# Patient Record
Sex: MALE | Race: BLACK OR AFRICAN AMERICAN | NOT HISPANIC OR LATINO
[De-identification: names, ages, dates, MRNs, and addresses within clinical notes are randomized per-mention and may not be internally consistent; named-entity substitution may affect disease eponyms.]

---

## 2022-07-14 PROBLEM — Z00.00 ENCOUNTER FOR PREVENTIVE HEALTH EXAMINATION: Status: ACTIVE | Noted: 2022-07-14

## 2022-07-27 ENCOUNTER — APPOINTMENT (OUTPATIENT)
Dept: GASTROENTEROLOGY | Facility: CLINIC | Age: 67
End: 2022-07-27

## 2023-02-09 ENCOUNTER — OFFICE (OUTPATIENT)
Dept: URBAN - METROPOLITAN AREA CLINIC 94 | Facility: CLINIC | Age: 68
Setting detail: OPHTHALMOLOGY
End: 2023-02-09
Payer: MEDICARE

## 2023-02-09 DIAGNOSIS — H25.13: ICD-10-CM

## 2023-02-09 DIAGNOSIS — E11.9: ICD-10-CM

## 2023-02-09 DIAGNOSIS — H35.033: ICD-10-CM

## 2023-02-09 DIAGNOSIS — H43.811: ICD-10-CM

## 2023-02-09 PROBLEM — H52.4 PRESBYOPIA: Status: ACTIVE | Noted: 2023-02-09

## 2023-02-09 PROCEDURE — 92004 COMPRE OPH EXAM NEW PT 1/>: CPT | Performed by: OPTOMETRIST

## 2023-02-09 PROCEDURE — 92250 FUNDUS PHOTOGRAPHY W/I&R: CPT | Performed by: OPTOMETRIST

## 2023-02-09 ASSESSMENT — REFRACTION_CURRENTRX
OD_SPHERE: +0.50
OS_AXIS: 075
OS_SPHERE: +1.75
OS_OVR_VA: 20/
OS_VPRISM_DIRECTION: SV
OD_OVR_VA: 20/
OS_CYLINDER: -0.50
OD_SPHERE: +1.50
OD_VPRISM_DIRECTION: SV
OS_OVR_VA: 20/
OS_SPHERE: +1.50
OD_CYLINDER: SPH
OD_VPRISM_DIRECTION: SV
OS_VPRISM_DIRECTION: SV
OD_OVR_VA: 20/

## 2023-02-09 ASSESSMENT — SPHEQUIV_DERIVED
OS_SPHEQUIV: -0.75
OS_SPHEQUIV: 0.25
OD_SPHEQUIV: 0.25
OS_SPHEQUIV: -0.75
OD_SPHEQUIV: 0.25
OD_SPHEQUIV: 0
OD_SPHEQUIV: 0.25
OS_SPHEQUIV: 0

## 2023-02-09 ASSESSMENT — REFRACTION_MANIFEST
OS_AXIS: 165
OD_ADD: +2.25
OS_CYLINDER: +0.50
OS_AXIS: 165
OS_SPHERE: +0.50
OD_SPHERE: +0.50
OD_AXIS: 100
OD_ADD: +2.25
OD_SPHERE: -0.25
OD_AXIS: 175
OS_VA1: 20/25-
OS_ADD: +2.25
OD_AXIS: 175
OS_ADD: +2.25
OD_CYLINDER: -0.50
OS_SPHERE: -1.00
OS_VA1: 20/25-
OS_VA1: 20/20
OD_CYLINDER: +1.00
OD_VA1: 20/20-
OD_VA1: 20/20
OD_VA1: 20/20-
OD_SPHERE: -0.25
OS_AXIS: 090
OS_CYLINDER: -0.50
OS_CYLINDER: +0.50
OD_CYLINDER: +1.00
OS_SPHERE: -1.00

## 2023-02-09 ASSESSMENT — AXIALLENGTH_DERIVED
OS_AL: 23.8237
OD_AL: 23.7713
OD_AL: 23.7713
OS_AL: 24.1255
OD_AL: 23.7713
OS_AL: 24.1255
OD_AL: 23.8706
OS_AL: 23.7248

## 2023-02-09 ASSESSMENT — REFRACTION_AUTOREFRACTION
OS_AXIS: 090
OD_SPHERE: +0.50
OS_CYLINDER: -1.00
OS_SPHERE: +0.50
OD_AXIS: 105
OD_CYLINDER: -1.00

## 2023-02-09 ASSESSMENT — VISUAL ACUITY
OS_BCVA: 20/25-1
OD_BCVA: 20/25

## 2023-02-09 ASSESSMENT — CONFRONTATIONAL VISUAL FIELD TEST (CVF)
OD_FINDINGS: FULL
OS_FINDINGS: FULL

## 2023-02-09 ASSESSMENT — KERATOMETRY
OS_AXISANGLE_DEGREES: 160
OS_K1POWER_DIOPTERS: 42.75
OS_K2POWER_DIOPTERS: 43.00
OD_K1POWER_DIOPTERS: 42.50
OD_AXISANGLE_DEGREES: 030
OD_K2POWER_DIOPTERS: 43.00

## 2023-02-09 ASSESSMENT — SUPERFICIAL PUNCTATE KERATITIS (SPK): OS_SPK: T

## 2023-03-03 ENCOUNTER — APPOINTMENT (OUTPATIENT)
Dept: GASTROENTEROLOGY | Facility: CLINIC | Age: 68
End: 2023-03-03
Payer: COMMERCIAL

## 2023-03-03 VITALS
OXYGEN SATURATION: 100 % | HEIGHT: 67 IN | DIASTOLIC BLOOD PRESSURE: 70 MMHG | BODY MASS INDEX: 21.35 KG/M2 | HEART RATE: 67 BPM | TEMPERATURE: 97.7 F | WEIGHT: 136 LBS | RESPIRATION RATE: 16 BRPM | SYSTOLIC BLOOD PRESSURE: 138 MMHG

## 2023-03-03 PROCEDURE — 99203 OFFICE O/P NEW LOW 30 MIN: CPT

## 2023-03-03 RX ORDER — SODIUM SULFATE, POTASSIUM SULFATE AND MAGNESIUM SULFATE 1.6; 3.13; 17.5 G/177ML; G/177ML; G/177ML
17.5-3.13-1.6 SOLUTION ORAL
Qty: 2 | Refills: 0 | Status: ACTIVE | COMMUNITY
Start: 2023-03-03 | End: 1900-01-01

## 2023-03-03 NOTE — ASSESSMENT
[FreeTextEntry1] : 67-year-old male with diabetes presents for colon cancer screening\par \par \par Average risk colon cancer screening\par Procedure reviewed with patient\par Bowel prep sent to pharmacy\par Preprocedure COVID testing ordered\par \par He has no evidence of cardiac decompensation at this time however in light of ongoing cardiac work-up would appreciate the records prior to procedure for review.\par Patient instructed to have the cardiologist fax over the records he is being seen by the [place] cardiology.\par

## 2023-03-03 NOTE — PHYSICAL EXAM
[Alert] : alert [Normal Voice/Communication] : normal voice/communication [Healthy Appearing] : healthy appearing [No Acute Distress] : no acute distress [Sclera] : the sclera and conjunctiva were normal [Hearing Threshold Finger Rub Not Weld] : hearing was normal [Normal Lips/Gums] : the lips and gums were normal [Oropharynx] : the oropharynx was normal [Normal Appearance] : the appearance of the neck was normal [No Neck Mass] : no neck mass was observed [No Respiratory Distress] : no respiratory distress [No Acc Muscle Use] : no accessory muscle use [Respiration, Rhythm And Depth] : normal respiratory rhythm and effort [Auscultation Breath Sounds / Voice Sounds] : lungs were clear to auscultation bilaterally [Heart Rate And Rhythm] : heart rate was normal and rhythm regular [None] : no edema [Bowel Sounds] : normal bowel sounds [Abdomen Tenderness] : non-tender [No Masses] : no abdominal mass palpated [Abdomen Soft] : soft [Cervical Lymph Nodes Enlarged Posterior Bilaterally] : no posterior cervical lymphadenopathy [Supraclavicular Lymph Nodes Enlarged Bilaterally] : no supraclavicular lymphadenopathy [Cervical Lymph Nodes Enlarged Anterior Bilaterally] : no anterior cervical lymphadenopathy [No CVA Tenderness] : no CVA  tenderness [No Spinal Tenderness] : no spinal tenderness [Abnormal Walk] : normal gait [No Focal Deficits] : no focal deficits [Oriented To Time, Place, And Person] : oriented to person, place, and time

## 2023-03-03 NOTE — REVIEW OF SYSTEMS
[Fever] : no fever [Chills] : no chills [Feeling Poorly] : not feeling poorly [Feeling Tired] : not feeling tired [Recent Weight Loss (___ Lbs)] : no recent weight loss [Abdominal Pain] : no abdominal pain [Vomiting] : no vomiting [Constipation] : no constipation [Diarrhea] : no diarrhea [Heartburn] : no heartburn [Melena (black stool)] : no melena [Bleeding] : no bleeding [Bloating (gassiness)] : no bloating [Negative] : Heme/Lymph

## 2023-03-03 NOTE — HISTORY OF PRESENT ILLNESS
[FreeTextEntry1] : 67-year-old male with a history of diabetes presents for colon cancer screening.\par \par He states his last colonoscopy was 5 or 6 years ago.  He states that he was told everything is okay \par He denies family history of colon cancer to his knowledge.  He denies weight loss, nausea, vomiting, or abdominal pain.  Denies rectal bleeding or melena.\par \par \par He states that a few months ago he noticed palpitations while falling asleep.  He was referred by his PCP through cardiology.  He has an upcoming appointment with the cardiologist on March 13, 2023.  He feels well and denies chest pain, dizziness, further palpitations, lightheadedness or nausea.

## 2023-03-29 LAB — SARS-COV-2 N GENE NPH QL NAA+PROBE: NOT DETECTED

## 2023-03-30 RX ORDER — CHLORHEXIDINE GLUCONATE 213 G/1000ML
1 SOLUTION TOPICAL ONCE
Refills: 0 | Status: DISCONTINUED | OUTPATIENT
Start: 2023-03-31 | End: 2023-04-01

## 2023-03-31 ENCOUNTER — INPATIENT (INPATIENT)
Facility: HOSPITAL | Age: 68
LOS: 0 days | Discharge: HOME | DRG: 247 | End: 2023-04-01
Attending: INTERNAL MEDICINE | Admitting: INTERNAL MEDICINE
Payer: COMMERCIAL

## 2023-03-31 ENCOUNTER — TRANSCRIPTION ENCOUNTER (OUTPATIENT)
Age: 68
End: 2023-03-31

## 2023-03-31 VITALS
RESPIRATION RATE: 20 BRPM | TEMPERATURE: 98 F | SYSTOLIC BLOOD PRESSURE: 129 MMHG | HEART RATE: 62 BPM | OXYGEN SATURATION: 99 % | DIASTOLIC BLOOD PRESSURE: 60 MMHG

## 2023-03-31 DIAGNOSIS — I25.10 ATHEROSCLEROTIC HEART DISEASE OF NATIVE CORONARY ARTERY WITHOUT ANGINA PECTORIS: ICD-10-CM

## 2023-03-31 LAB
ABO RH CONFIRMATION: SIGNIFICANT CHANGE UP
ANION GAP SERPL CALC-SCNC: 14 MMOL/L — SIGNIFICANT CHANGE UP (ref 5–17)
BASOPHILS # BLD AUTO: 0.04 K/UL — SIGNIFICANT CHANGE UP (ref 0–0.2)
BASOPHILS NFR BLD AUTO: 0.8 % — SIGNIFICANT CHANGE UP (ref 0–2)
BLD GP AB SCN SERPL QL: SIGNIFICANT CHANGE UP
BUN SERPL-MCNC: 13.5 MG/DL — SIGNIFICANT CHANGE UP (ref 8–20)
CALCIUM SERPL-MCNC: 9.7 MG/DL — SIGNIFICANT CHANGE UP (ref 8.4–10.5)
CHLORIDE SERPL-SCNC: 102 MMOL/L — SIGNIFICANT CHANGE UP (ref 96–108)
CO2 SERPL-SCNC: 25 MMOL/L — SIGNIFICANT CHANGE UP (ref 22–29)
CREAT SERPL-MCNC: 1.1 MG/DL — SIGNIFICANT CHANGE UP (ref 0.5–1.3)
EGFR: 74 ML/MIN/1.73M2 — SIGNIFICANT CHANGE UP
EOSINOPHIL # BLD AUTO: 0.15 K/UL — SIGNIFICANT CHANGE UP (ref 0–0.5)
EOSINOPHIL NFR BLD AUTO: 3.1 % — SIGNIFICANT CHANGE UP (ref 0–6)
GLUCOSE BLDC GLUCOMTR-MCNC: 239 MG/DL — HIGH (ref 70–99)
GLUCOSE SERPL-MCNC: 181 MG/DL — HIGH (ref 70–99)
HCT VFR BLD CALC: 41.8 % — SIGNIFICANT CHANGE UP (ref 39–50)
HGB BLD-MCNC: 13.8 G/DL — SIGNIFICANT CHANGE UP (ref 13–17)
IMM GRANULOCYTES NFR BLD AUTO: 0.2 % — SIGNIFICANT CHANGE UP (ref 0–0.9)
LYMPHOCYTES # BLD AUTO: 1.34 K/UL — SIGNIFICANT CHANGE UP (ref 1–3.3)
LYMPHOCYTES # BLD AUTO: 27.5 % — SIGNIFICANT CHANGE UP (ref 13–44)
MCHC RBC-ENTMCNC: 31 PG — SIGNIFICANT CHANGE UP (ref 27–34)
MCHC RBC-ENTMCNC: 33 GM/DL — SIGNIFICANT CHANGE UP (ref 32–36)
MCV RBC AUTO: 93.9 FL — SIGNIFICANT CHANGE UP (ref 80–100)
MONOCYTES # BLD AUTO: 0.53 K/UL — SIGNIFICANT CHANGE UP (ref 0–0.9)
MONOCYTES NFR BLD AUTO: 10.9 % — SIGNIFICANT CHANGE UP (ref 2–14)
NEUTROPHILS # BLD AUTO: 2.81 K/UL — SIGNIFICANT CHANGE UP (ref 1.8–7.4)
NEUTROPHILS NFR BLD AUTO: 57.5 % — SIGNIFICANT CHANGE UP (ref 43–77)
PA ADP PRP-ACNC: 256 PRU — SIGNIFICANT CHANGE UP (ref 180–376)
PLATELET # BLD AUTO: 243 K/UL — SIGNIFICANT CHANGE UP (ref 150–400)
POTASSIUM SERPL-MCNC: 4.5 MMOL/L — SIGNIFICANT CHANGE UP (ref 3.5–5.3)
POTASSIUM SERPL-SCNC: 4.5 MMOL/L — SIGNIFICANT CHANGE UP (ref 3.5–5.3)
RBC # BLD: 4.45 M/UL — SIGNIFICANT CHANGE UP (ref 4.2–5.8)
RBC # FLD: 12.9 % — SIGNIFICANT CHANGE UP (ref 10.3–14.5)
SODIUM SERPL-SCNC: 141 MMOL/L — SIGNIFICANT CHANGE UP (ref 135–145)
WBC # BLD: 4.88 K/UL — SIGNIFICANT CHANGE UP (ref 3.8–10.5)
WBC # FLD AUTO: 4.88 K/UL — SIGNIFICANT CHANGE UP (ref 3.8–10.5)

## 2023-03-31 PROCEDURE — 93010 ELECTROCARDIOGRAM REPORT: CPT | Mod: 76

## 2023-03-31 RX ORDER — SODIUM CHLORIDE 9 MG/ML
250 INJECTION INTRAMUSCULAR; INTRAVENOUS; SUBCUTANEOUS ONCE
Refills: 0 | Status: COMPLETED | OUTPATIENT
Start: 2023-03-31 | End: 2023-03-31

## 2023-03-31 RX ORDER — ASPIRIN/CALCIUM CARB/MAGNESIUM 324 MG
1 TABLET ORAL
Refills: 0 | DISCHARGE

## 2023-03-31 RX ORDER — PRASUGREL 5 MG/1
1 TABLET, FILM COATED ORAL
Qty: 0 | Refills: 0 | DISCHARGE
Start: 2023-03-31

## 2023-03-31 RX ORDER — SODIUM CHLORIDE 9 MG/ML
1000 INJECTION, SOLUTION INTRAVENOUS
Refills: 0 | Status: DISCONTINUED | OUTPATIENT
Start: 2023-03-31 | End: 2023-04-01

## 2023-03-31 RX ORDER — ATORVASTATIN CALCIUM 80 MG/1
40 TABLET, FILM COATED ORAL AT BEDTIME
Refills: 0 | Status: DISCONTINUED | OUTPATIENT
Start: 2023-03-31 | End: 2023-04-01

## 2023-03-31 RX ORDER — PRASUGREL 5 MG/1
1 TABLET, FILM COATED ORAL
Qty: 90 | Refills: 3
Start: 2023-03-31 | End: 2024-03-24

## 2023-03-31 RX ORDER — INSULIN LISPRO 100/ML
VIAL (ML) SUBCUTANEOUS
Refills: 0 | Status: DISCONTINUED | OUTPATIENT
Start: 2023-03-31 | End: 2023-04-01

## 2023-03-31 RX ORDER — PRASUGREL 5 MG/1
60 TABLET, FILM COATED ORAL ONCE
Refills: 0 | Status: COMPLETED | OUTPATIENT
Start: 2023-03-31 | End: 2023-03-31

## 2023-03-31 RX ORDER — ASPIRIN/CALCIUM CARB/MAGNESIUM 324 MG
81 TABLET ORAL DAILY
Refills: 0 | Status: DISCONTINUED | OUTPATIENT
Start: 2023-03-31 | End: 2023-04-01

## 2023-03-31 RX ORDER — DEXTROSE 50 % IN WATER 50 %
15 SYRINGE (ML) INTRAVENOUS ONCE
Refills: 0 | Status: DISCONTINUED | OUTPATIENT
Start: 2023-03-31 | End: 2023-04-01

## 2023-03-31 RX ORDER — DEXTROSE 50 % IN WATER 50 %
25 SYRINGE (ML) INTRAVENOUS ONCE
Refills: 0 | Status: DISCONTINUED | OUTPATIENT
Start: 2023-03-31 | End: 2023-04-01

## 2023-03-31 RX ORDER — DEXTROSE 50 % IN WATER 50 %
12.5 SYRINGE (ML) INTRAVENOUS ONCE
Refills: 0 | Status: DISCONTINUED | OUTPATIENT
Start: 2023-03-31 | End: 2023-04-01

## 2023-03-31 RX ORDER — PRASUGREL 5 MG/1
10 TABLET, FILM COATED ORAL DAILY
Refills: 0 | Status: DISCONTINUED | OUTPATIENT
Start: 2023-04-01 | End: 2023-04-01

## 2023-03-31 RX ORDER — ATORVASTATIN CALCIUM 80 MG/1
1 TABLET, FILM COATED ORAL
Refills: 0 | DISCHARGE

## 2023-03-31 RX ORDER — INSULIN LISPRO 100/ML
VIAL (ML) SUBCUTANEOUS AT BEDTIME
Refills: 0 | Status: DISCONTINUED | OUTPATIENT
Start: 2023-03-31 | End: 2023-04-01

## 2023-03-31 RX ORDER — GLUCAGON INJECTION, SOLUTION 0.5 MG/.1ML
1 INJECTION, SOLUTION SUBCUTANEOUS ONCE
Refills: 0 | Status: DISCONTINUED | OUTPATIENT
Start: 2023-03-31 | End: 2023-04-01

## 2023-03-31 RX ORDER — ACETAMINOPHEN 500 MG
1000 TABLET ORAL ONCE
Refills: 0 | Status: DISCONTINUED | OUTPATIENT
Start: 2023-03-31 | End: 2023-04-01

## 2023-03-31 RX ORDER — CLOPIDOGREL BISULFATE 75 MG/1
75 TABLET, FILM COATED ORAL DAILY
Refills: 0 | Status: DISCONTINUED | OUTPATIENT
Start: 2023-03-31 | End: 2023-03-31

## 2023-03-31 RX ADMIN — PRASUGREL 60 MILLIGRAM(S): 5 TABLET, FILM COATED ORAL at 15:22

## 2023-03-31 RX ADMIN — ATORVASTATIN CALCIUM 40 MILLIGRAM(S): 80 TABLET, FILM COATED ORAL at 21:31

## 2023-03-31 RX ADMIN — Medication 81 MILLIGRAM(S): at 10:44

## 2023-03-31 RX ADMIN — CLOPIDOGREL BISULFATE 75 MILLIGRAM(S): 75 TABLET, FILM COATED ORAL at 10:44

## 2023-03-31 RX ADMIN — SODIUM CHLORIDE 250 MILLILITER(S): 9 INJECTION INTRAMUSCULAR; INTRAVENOUS; SUBCUTANEOUS at 11:45

## 2023-03-31 RX ADMIN — SODIUM CHLORIDE 250 MILLILITER(S): 9 INJECTION INTRAMUSCULAR; INTRAVENOUS; SUBCUTANEOUS at 16:08

## 2023-03-31 NOTE — DISCHARGE NOTE PROVIDER - NSDCFUADDINST_GEN_ALL_CORE_FT
Go to the ED with any acute onset of chest pain, palpitations, shortness of breath or dizziness. Do NOT miss a dose or stop taking your Aspirin and Effient, these medications keep your stent open and prevent a heart attack. If anyone tells you to stop these medications, speak to your cardiologist immediately.  Managing risk factors will help keep your stent open and prevent future blockages, risk factors may include: high blood pressure, high cholesterol, diabetes, obesity, sedentary life style and smoking.    Your diet should be low in fat, cholesterol, salt and carbohydrates, increase fruits (caution if diabetic), vegetables and whole grains/fiber rich foods.   Take all your cardiac  medications as prescribed.    No heavy lifting, excessine bending or range of motion on affected limb 5-7days.  No submerging the site in water (pool/bath) 5-7days. You may start showering the day after your procedure. Remove the dressing, cleanse the area with plain soap and water and then pat dry. You may place a bandaid for 1-2 days, NO cream,/lotion/medicines on the site.   Bruising and a small nodule at the site is normal. Call your doctor for any bleeding, swelling/hardness, increasing pain or redness, loss of sensation in the hand/leg or discoloration.   If heavy bleeding or large lumps form, hold pressure at the spot and come to the Emergency Room.  Exercise is a very important factor in heart health. Once your post procedure restrictions have passed, you should engage in heart healthy, aerobic exercise. Be sure to have clearance from your cardiologist. Cardiac rehab programs could be extremely beneficial and your cardiologist could help set this up.   Follow up with your cardiologist within 1-2 weeks after your procedure.   Call your cardiologist or our unit ([phone]) with any questions or concerns that may arise.

## 2023-03-31 NOTE — DISCHARGE NOTE PROVIDER - HOSPITAL COURSE
Assessment: 67yo male with h/o HLD, DM, prior COVID, experiencing exertional chest pressure and palpitations, referred to Dr Snell and NST with basal anetroseptal ischemia and +TID with ratio 1.36, referred to Dr Kraft and underwent C 3/13/23 with significant 1V CAD (mLAD 80% and Diag 80%) no PCI at that time and now presented for stage PCI to LAD/Diag by Dr Kraft.     Now s/p BECCA X 1 to mLAD and BECCA X 2 to Diag, BECCA X 2 to Diag via RFA, procedure performed by Dr. Kraft, received Plavix load, heparin for A/C, Fentanyl and Versed IV intraprocedurally, arrived to recovery in NAD and HDS, Post PCI ECG with no acute changes, SB/SR 40-50's on tele, asymptomatic, avoid AVN blockers for now, RFA closed with perclose, access site stable, no bleed/hematoma, distal pulse +, plan for overnight admission.       P2Y12: 256, discussed with Dr Kraft and Plavix D/C'd and given Effient 60mg po load and will cont Effient 10mg po daily in addition to daily aspirin 81mg po daily.    Effient script sent to [facility], approved and covered by insurance, can  upon discharge tomorrow am and will be given a 90day supply with 3 refils and also given a paper script.    Plan:  -Formal cath report pending  -Post procedure management/monitoring per protocol  -Access site precautions  -Bedrest x 3hours post procedure  -Labs and EKG in am  -Repeat ECG if any clinical indication or change on tele  -NS 250mL bolus post cath   -Continue current medical therapy  -Dual anti platelet therapy with aspirin 81mg po daily and Effient 10mg po daily  -Cont statin therapy with Lipitor 40mg po qHS, consider increasing pending am lipid panel  -Hold metformin X 48hrs  -F/S QID with correction scale coverage while in-patient  -Educated regarding strict adherence with DAPT   -Educated regarding post procedure management and care  -Discussed the importance of RF modification  -Cardiac rehab info provided/referral and communication to cardiac rehab completed  -F/U outpt in 1-2 weeks with Cardiologist Dr. Snell   -DISPO: Plan for D/C in am if remains HDS, ECG and labs in am stable and without complications           Assessment: 69yo male with h/o HLD, DM, prior COVID, experiencing exertional chest pressure and palpitations, referred to Dr Snell and NST with basal anetroseptal ischemia and +TID with ratio 1.36, referred to Dr Kraft and underwent [facility] 3/13/23 with significant 1V CAD (mLAD 80% and Diag 80%) no PCI at that time and now presented for stage PCI to LAD/Diag by Dr Kraft.     Now s/p BECCA X 1 to mLAD and BECCA X 2 to Diag, BECCA X 2 to Diag via RFA, procedure performed by Dr. Kraft.  SB/SR 40-50's on tele, asymptomatic, avoid AVN blockers for now  RFA closed with perclose with hematoma.  CBC stable.  US with no pseduo.  Spoke to Dr. Ohara and pt cleared for discharge           P2Y12: 256, discussed with Dr Kraft and Plavix D/C'd and given Effient 60mg po load and will cont Effient 10mg po daily in addition to daily aspirin 81mg po daily.

## 2023-03-31 NOTE — DISCHARGE NOTE PROVIDER - NSDCMRMEDTOKEN_GEN_ALL_CORE_FT
Aspir 81 oral delayed release tablet: 1 orally once a day  atorvastatin 40 mg oral tablet: 1 orally once a day  metFORMIN 1000 mg oral tablet: 1 orally 2 times a day  Plavix 75 mg oral tablet: 1 orally once a day  prasugrel 10 mg oral tablet: 1 tab(s) orally once a day   Aspir 81 oral delayed release tablet: 1 orally once a day  atorvastatin 40 mg oral tablet: 1 orally once a day  metFORMIN 1000 mg oral tablet: 1 tablet orally 2 times a day hold for 48 hours  prasugrel 10 mg oral tablet: 1 tab(s) orally once a day  prasugrel 10 mg oral tablet: 1 tab(s) orally once a day

## 2023-03-31 NOTE — DISCHARGE NOTE PROVIDER - NSDCCPTREATMENT_GEN_ALL_CORE_FT
PRINCIPAL PROCEDURE  Procedure: Percutaneous coronary intervention (PCI) with insertion of stent into left anterior descending (LAD) coronary artery  Findings and Treatment: Go to the ED with any acute onset of chest pain, palpitations, shortness of breath or dizziness. Do NOT miss a dose or stop taking your Aspirin and Effient, these medications keep your stent open and prevent a heart attack. If anyone tells you to stop these medications, speak to your cardiologist immediately.  Managing risk factors will help keep your stent open and prevent future blockages, risk factors may include: high blood pressure, high cholesterol, diabetes, obesity, sedentary life style and smoking.    Your diet should be low in fat, cholesterol, salt and carbohydrates, increase fruits (caution if diabetic), vegetables and whole grains/fiber rich foods.   Take all your cardiac  medications as prescribed.    No heavy lifting, excessine bending or range of motion on affected limb 5-7days.  No submerging the site in water (pool/bath) 5-7days. You may start showering the day after your procedure. Remove the dressing, cleanse the area with plain soap and water and then pat dry. You may place a bandaid for 1-2 days, NO cream,/lotion/medicines on the site.   Bruising and a small nodule at the site is normal. Call your doctor for any bleeding, swelling/hardness, increasing pain or redness, loss of sensation in the hand/leg or discoloration.   If heavy bleeding or large lumps form, hold pressure at the spot and come to the Emergency Room.  Exercise is a very important factor in heart health. Once your post procedure restrictions have passed, you should engage in heart healthy, aerobic exercise. Be sure to have clearance from your cardiologist. Cardiac rehab programs could be extremely beneficial and your cardiologist could help set this up.   Follow up with your cardiologist within 1-2 weeks after your procedure.   Call your cardiologist or our unit ([phone]) with any questions or concerns that may arise.

## 2023-03-31 NOTE — DISCHARGE NOTE PROVIDER - CARE PROVIDER_API CALL
Jamar Snell)  Cardiovascular Disease; Internal Medicine  [address] [place] [address]  Phone: [phone]  Fax: [phone]  Follow Up Time:     Kvng Kraft)  Cardiovascular Disease; Internal Medicine; Interventional Cardiology  [address] [place] [address]  Phone: [phone]  Fax: [phone]  Follow Up Time:

## 2023-03-31 NOTE — H&P PST ADULT - HISTORY OF PRESENT ILLNESS
Department of Cardiology                                                                  [facility]/[place] [address] [address]                                                            Telephone: [phone]. Fax:[phone]                                                                             Pre- Procedure Progress Note      HPI:  69yo male with h/o HLD, DM, prior COVID, experiencing exertional chest pressure and palpitations, referred to Dr Snell and NST with basal anetroseptal ischemia and +TID with ratio 1.36, referred to Dr Kraft and underwent [facility] 3/13/23 with significant 1V CAD (mLAD 80% and Diag 80%) no PCI at that time and now presented for stage PCI to LAD/Diag by Dr Kraft.       Symptoms:        Angina (Class): III       Ischemic Symptoms: CP    Heart Failure: no    Assessment of LVEF:       EF: 60-65%       Assessed by: TTE       Date: 8/10/22    Echo 8/10/22:  LVEF 60-65%  trace MR/TR     [facility] GSH 3/13/23:  No sign LMCA disease  mild disease LC and RCA  mLAD 80%  Diag 80%    Stress Test: Date: 9/20/22   basal anetroseptal ischemia and +TID with ratio 1.36        Associated Risk Factors:        Frailty Assessment: (none/mild/mod/severe)       Cerebrovascular Disease: N/A       Chronic Lung Disease: N/A       Peripheral Arterial Disease: N/A       Chronic Kidney Disease (if yes, what is GFR): N/A       Uncontrolled Diabetes (if yes, what is HgbA1C or FBS): N/A       Poorly Controlled Hypertension (if yes, what is SBP): N/A       Morbid Obesity (if yes, what is BMI): N/A       History of Recent Ventricular Arrhythmia: N/A       Inability to Ambulate Safely: N/A       Need for Therapeutic Anticoagulation: N/A       Antiplatelet or Contrast Allergy: N/A    Antianginal Therapies:        Beta Blockers:         Calcium Channel Blockers:        Long Acting Nitrates:        Ranexa:     	  MEDICATIONS:            atorvastatin 40 milliGRAM(s) Oral at bedtime    aspirin  chewable 81 milliGRAM(s) Oral daily  chlorhexidine 4% Liquid 1 Application(s) Topical Once  clopidogrel Tablet 75 milliGRAM(s) Oral daily  sodium chloride 0.9% Bolus 250 milliLiter(s) IV Bolus once        ROS: as stated above, otherwise negative    PHYSICAL EXAM:  Constitutional: A & O x 3  HEENT:   Normal oral mucosa, PERRL, EOMI	  Cardiovascular: Normal S1 S2, No JVD, No murmurs  Respiratory: Lungs clear to auscultation	  Gastrointestinal:  Soft, Non-tender, + BS	  Skin: No rashes, No ecchymoses, No cyanosis  Neurologic: Non-focal  Extremities: Normal range of motion, no edema  Vascular: Peripheral pulses palpable + bilaterally      T(C): 36.8 (03-31-23 @ 09:38), Max: 36.8 (03-31-23 @ 09:38)  HR: 43 (03-31-23 @ 13:10) (43 - 62)  BP: 123/58 (03-31-23 @ 13:10) (123/58 - 143/63)  RR: 15 (03-31-23 @ 13:10) (15 - 20)  SpO2: 98% (03-31-23 @ 13:10) (98% - 99%)  Wt(kg): --      I&O's Summary      Daily     Daily     TELEMETRY: 	      ECG:  	    LABS:	 	                                    13.8   4.88  )-----------( 243      ( 31 Mar 2023 09:42 )             41.8     03-31    141  |  102  |  13.5  ----------------------------<  181<H>  4.5   |  25.0  |  1.10    Ca    9.7      31 Mar 2023 09:42        Tnl:    Lipid Profile:   TC  TG  LDL  HDL    HgA1c:     proBNP:       Indication:     Risk Stratification:  ASA:  Mallampati:  Bleeding Risk:  Creatinine:  GFR:    Coronary anatomy:    Plan/Recommendations:   -plan for ****  -preferred access: RRA ***  -patient seen and examined  -confirmed appropriate NPO duration  -ECG and Labs reviewed  -Aspirin 81mg po pre-cath***  -NS 250mL IV bolus pre-cath***  -procedure discussed with patient; risks and benefits explained, questions answered  -consent obtained by attending IC      Risks, benefits, and alternatives reviewed.  Risks including but not limited to MI, death, stroke, bleeding, infection, vessel injury, hematoma, renal failure, allergic reaction, urgent open heart surgery, restenosis and stent thrombosis were reviewed.  All questions answered.  Patient is agreeable to proceed.                                                                              Department of Cardiology                                                                  [facility]/[place] [address] [address]                                                            Telephone: [phone]. Fax:[phone]                                                                             Pre- Procedure Progress Note      HPI:  69yo male with h/o HLD, DM, prior COVID, experiencing exertional chest pressure and palpitations, referred to Dr Snell and NST with basal anetroseptal ischemia and +TID with ratio 1.36, referred to Dr Kraft and underwent [facility] 3/13/23 with significant 1V CAD (mLAD 80% and Diag 80%) no PCI at that time and now presented for stage PCI to LAD/Diag by Dr Kraft.       Symptoms:        Angina (Class): III       Ischemic Symptoms: CP    Heart Failure: no    Assessment of LVEF:       EF: 60-65%       Assessed by: TTE       Date: 8/10/22    Echo 8/10/22:  LVEF 60-65%  trace MR/TR     [facility] GSH 3/13/23:  No sign LMCA disease  mild disease LC and RCA  mLAD 80%  Diag 80%    Stress Test: Date: 9/20/22   basal anetroseptal ischemia and +TID with ratio 1.36        Associated Risk Factors:        Frailty Assessment: (none/mild/mod/severe): mild       Cerebrovascular Disease: N/A       Chronic Lung Disease: N/A       Peripheral Arterial Disease: N/A       Chronic Kidney Disease (if yes, what is GFR): N/A       Uncontrolled Diabetes (if yes, what is HgbA1C or FBS): N/A       Poorly Controlled Hypertension (if yes, what is SBP): N/A       Morbid Obesity (if yes, what is BMI): N/A       History of Recent Ventricular Arrhythmia: N/A       Inability to Ambulate Safely: N/A       Need for Therapeutic Anticoagulation: N/A       Antiplatelet or Contrast Allergy: N/A    Antianginal Therapies: none       Beta Blockers:         Calcium Channel Blockers:        Long Acting Nitrates:        Ranexa:     Meds:  atorvastatin 40 milliGRAM(s) Oral at bedtime  aspirin  chewable 81 milliGRAM(s) Oral daily  chlorhexidine 4% Liquid 1 Application(s) Topical Once  clopidogrel Tablet 75 milliGRAM(s) Oral daily  sodium chloride 0.9% Bolus 250 milliLiter(s) IV Bolus once      ROS: as stated above, otherwise negative    PHYSICAL EXAM:  Constitutional: A & O x 3  HEENT:   Normal oral mucosa, PERRL, EOMI	  Cardiovascular: Normal S1 S2, No JVD, No murmurs  Respiratory: Lungs clear to auscultation	  Gastrointestinal:  Soft, Non-tender, + BS	  Skin: No rashes, No ecchymoses, No cyanosis  Neurologic: Non-focal  Extremities: Normal range of motion, no edema  Vascular: Peripheral pulses palpable + bilaterally      T(C): 36.8 (03-31-23 @ 09:38), Max: 36.8 (03-31-23 @ 09:38)  HR: 43 (03-31-23 @ 13:10) (43 - 62)  BP: 123/58 (03-31-23 @ 13:10) (123/58 - 143/63)  RR: 15 (03-31-23 @ 13:10) (15 - 20)  SpO2: 98% (03-31-23 @ 13:10) (98% - 99%)    ECG:  SR 62	    LABS:	                           13.8   4.88  )-----------( 243      ( 31 Mar 2023 09:42 )             41.8     03-31    141  |  102  |  13.5  ----------------------------<  181<H>  4.5   |  25.0  |  1.10    Ca    9.7      31 Mar 2023 09:42      Lipid Profile: with am labs    HgA1c: with am labs

## 2023-03-31 NOTE — DISCHARGE NOTE PROVIDER - NSDCFUSCHEDAPPT_GEN_ALL_CORE_FT
Cheri Polo  [facility]  GASTRO [address]  Scheduled Appointment: 05/23/2023

## 2023-03-31 NOTE — H&P PST ADULT - ASSESSMENT
Indication: 67yo male with class III anginal symptoms, NST with basal anteroseptal ischemia, + TID with ratio 1.36    Risk Stratification:  ASA: 3  Mallampati: 2  Bleeding Risk: 1.2%  Creatinine: 1.1  GFR: 74    Coronary anatomy:  LMCA no disease  mLAD 80%  Diag 80%  LCx mild disease  RCA mild disease    Plan/Recommendations:   -plan for stage PCI to LAD/Diag  -preferred access: RA vs FA  -patient seen and examined  -confirmed appropriate NPO duration  -ECG and Labs reviewed  -Aspirin 81mg and Plavix 75mg po pre-cath  -NS 250mL IV bolus pre-cath  -procedure discussed with patient; risks and benefits explained, questions answered  -consent obtained by attending IC      Risks, benefits, and alternatives reviewed.  Risks including but not limited to MI, death, stroke, bleeding, infection, vessel injury, hematoma, renal failure, allergic reaction, urgent open heart surgery, restenosis and stent thrombosis were reviewed.  All questions answered.  Patient is agreeable to proceed.

## 2023-03-31 NOTE — ASU PATIENT PROFILE, ADULT - FALL HARM RISK - UNIVERSAL INTERVENTIONS
Bed in lowest position, wheels locked, appropriate side rails in place/Call bell, personal items and telephone in reach/Instruct patient to call for assistance before getting out of bed or chair/Non-slip footwear when patient is out of bed/[place] to call system/Physically safe environment - no spills, clutter or unnecessary equipment/Purposeful Proactive Rounding/Room/bathroom lighting operational, light cord in reach

## 2023-03-31 NOTE — DISCHARGE NOTE PROVIDER - NSDCCPCAREPLAN_GEN_ALL_CORE_FT
PRINCIPAL DISCHARGE DIAGNOSIS  Diagnosis: CAD (coronary artery disease)  Assessment and Plan of Treatment: Coronary artery disease is the buildup of plaque in the arteries that supply oxygen-rich blood to your heart. Plaque causes a narrowing or blockage that could result in a heart attack. Symptoms include chest pain or discomfort, shortness of breath, dizziness, palpitations, fatigue or reduced exercise tolerance. .  Go to the ED with any acute onset of chest pain, palpitations, shortness of breath or dizziness. Do NOT miss a dose or stop taking your Aspirin and Effient, these medications keep your stent open and prevent a heart attack. If anyone tells you to stop these medications, speak to your cardiologist immediately.  Managing risk factors will help keep your stent open and prevent future blockages, risk factors may include: high blood pressure, high cholesterol, diabetes, obesity, sedentary life style and smoking.    Your diet should be low in fat, cholesterol, salt and carbohydrates, increase fruits (caution if diabetic), vegetables and whole grains/fiber rich foods.   Take all your cardiac  medications as prescribed.    Exercise is a very important factor in heart health. Once your post procedure restrictions have passed, you should engage in heart healthy, aerobic exercise. Be sure to have clearance from your cardiologist. Cardiac rehab programs could be extremely beneficial and your cardiologist could help set this up.   Follow up with your cardiologist within 1-2 weeks after your procedure.   Call your cardiologist or our unit ([phone]) with any questions or concerns that may arise.     PRINCIPAL DISCHARGE DIAGNOSIS  Diagnosis: CAD (coronary artery disease)  Assessment and Plan of Treatment: Coronary artery disease is the buildup of plaque in the arteries that supply oxygen-rich blood to your heart. Plaque causes a narrowing or blockage that could result in a heart attack. Symptoms include chest pain or discomfort, shortness of breath, dizziness, palpitations, fatigue or reduced exercise tolerance. .  Go to the ED with any acute onset of chest pain, palpitations, shortness of breath or dizziness. Do NOT miss a dose or stop taking your Aspirin and Effient, these medications keep your stent open and prevent a heart attack. If anyone tells you to stop these medications, speak to your cardiologist immediately.  Managing risk factors will help keep your stent open and prevent future blockages, risk factors may include: high blood pressure, high cholesterol, diabetes, obesity, sedentary life style and smoking.    Your diet should be low in fat, cholesterol, salt and carbohydrates, increase fruits (caution if diabetic), vegetables and whole grains/fiber rich foods.   Take all your cardiac  medications as prescribed.    Exercise is a very important factor in heart health. Once your post procedure restrictions have passed, you should engage in heart healthy, aerobic exercise. Be sure to have clearance from your cardiologist. Cardiac rehab programs could be extremely beneficial and your cardiologist could help set this up.   Follow up with your cardiologist within 1-2 weeks after your procedure.   Call your cardiologist or our unit ([phone]) with any questions or concerns that may arise.      SECONDARY DISCHARGE DIAGNOSES  Diagnosis: HLD (hyperlipidemia)  Assessment and Plan of Treatment: Your diet should be low in fat, cholesterol, salt and carbohydrates, increase fruits (caution if diabetic), vegetables and whole grains/fiber rich foods. Take your cholesterol lowering medications as prescribed. Routine follow up lipid panels    Diagnosis: DM (diabetes mellitus)  Assessment and Plan of Treatment: Follow a carbohydrate controlled diet. Monitor your blood sugar levels before meals and at bedtime and keep a log of your values. Follow up for routine HbA1C levels which indicate your sugar control over the previous few months. You should ensure you have a provider that is closely monitoring your diabetes and keeping your sugar levels well controlled. Weight loss, carb controlled healthy diet and exercise can help improve your glucose control. It is also improtant to monitor for low sugar levels as levels too low (below 70) can be very dangerous. You should always carry sugar tablets or something to help bring the sugar level up if it drops too low. Symptoms of low sugar levls are: fatigue, lightheaded, mental confusion, fainting, sweating, shaking and excessive hunger.

## 2023-04-01 ENCOUNTER — TRANSCRIPTION ENCOUNTER (OUTPATIENT)
Age: 68
End: 2023-04-01

## 2023-04-01 VITALS — HEART RATE: 66 BPM | SYSTOLIC BLOOD PRESSURE: 110 MMHG | RESPIRATION RATE: 16 BRPM | DIASTOLIC BLOOD PRESSURE: 59 MMHG

## 2023-04-01 LAB
A1C WITH ESTIMATED AVERAGE GLUCOSE RESULT: 9.2 % — HIGH (ref 4–5.6)
ALBUMIN SERPL ELPH-MCNC: 3.8 G/DL — SIGNIFICANT CHANGE UP (ref 3.3–5.2)
ALP SERPL-CCNC: 66 U/L — SIGNIFICANT CHANGE UP (ref 40–120)
ALT FLD-CCNC: 37 U/L — SIGNIFICANT CHANGE UP
ANION GAP SERPL CALC-SCNC: 11 MMOL/L — SIGNIFICANT CHANGE UP (ref 5–17)
AST SERPL-CCNC: 42 U/L — HIGH
BASOPHILS # BLD AUTO: 0.05 K/UL — SIGNIFICANT CHANGE UP (ref 0–0.2)
BASOPHILS NFR BLD AUTO: 0.9 % — SIGNIFICANT CHANGE UP (ref 0–2)
BILIRUB SERPL-MCNC: 0.5 MG/DL — SIGNIFICANT CHANGE UP (ref 0.4–2)
BUN SERPL-MCNC: 19.1 MG/DL — SIGNIFICANT CHANGE UP (ref 8–20)
CALCIUM SERPL-MCNC: 9.3 MG/DL — SIGNIFICANT CHANGE UP (ref 8.4–10.5)
CHLORIDE SERPL-SCNC: 101 MMOL/L — SIGNIFICANT CHANGE UP (ref 96–108)
CHOLEST SERPL-MCNC: 102 MG/DL — SIGNIFICANT CHANGE UP
CO2 SERPL-SCNC: 22 MMOL/L — SIGNIFICANT CHANGE UP (ref 22–29)
CREAT SERPL-MCNC: 1.02 MG/DL — SIGNIFICANT CHANGE UP (ref 0.5–1.3)
EGFR: 81 ML/MIN/1.73M2 — SIGNIFICANT CHANGE UP
EOSINOPHIL # BLD AUTO: 0.21 K/UL — SIGNIFICANT CHANGE UP (ref 0–0.5)
EOSINOPHIL NFR BLD AUTO: 3.8 % — SIGNIFICANT CHANGE UP (ref 0–6)
ESTIMATED AVERAGE GLUCOSE: 217 MG/DL — HIGH (ref 68–114)
GLUCOSE SERPL-MCNC: 137 MG/DL — HIGH (ref 70–99)
GLUCOSE SERPL-MCNC: 137 MG/DL — HIGH (ref 70–99)
HCT VFR BLD CALC: 34.5 % — LOW (ref 39–50)
HCT VFR BLD CALC: 35.6 % — LOW (ref 39–50)
HCT VFR BLD CALC: 35.6 % — LOW (ref 39–50)
HDLC SERPL-MCNC: 35 MG/DL — LOW
HGB BLD-MCNC: 11.7 G/DL — LOW (ref 13–17)
HGB BLD-MCNC: 11.9 G/DL — LOW (ref 13–17)
HGB BLD-MCNC: 12 G/DL — LOW (ref 13–17)
IMM GRANULOCYTES NFR BLD AUTO: 0.5 % — SIGNIFICANT CHANGE UP (ref 0–0.9)
LIPID PNL WITH DIRECT LDL SERPL: 59 MG/DL — SIGNIFICANT CHANGE UP
LYMPHOCYTES # BLD AUTO: 1.18 K/UL — SIGNIFICANT CHANGE UP (ref 1–3.3)
LYMPHOCYTES # BLD AUTO: 21.3 % — SIGNIFICANT CHANGE UP (ref 13–44)
MAGNESIUM SERPL-MCNC: 1.9 MG/DL — SIGNIFICANT CHANGE UP (ref 1.6–2.6)
MCHC RBC-ENTMCNC: 31.4 PG — SIGNIFICANT CHANGE UP (ref 27–34)
MCHC RBC-ENTMCNC: 31.6 PG — SIGNIFICANT CHANGE UP (ref 27–34)
MCHC RBC-ENTMCNC: 31.7 PG — SIGNIFICANT CHANGE UP (ref 27–34)
MCHC RBC-ENTMCNC: 33.4 GM/DL — SIGNIFICANT CHANGE UP (ref 32–36)
MCHC RBC-ENTMCNC: 33.7 GM/DL — SIGNIFICANT CHANGE UP (ref 32–36)
MCHC RBC-ENTMCNC: 33.9 GM/DL — SIGNIFICANT CHANGE UP (ref 32–36)
MCV RBC AUTO: 93.5 FL — SIGNIFICANT CHANGE UP (ref 80–100)
MCV RBC AUTO: 93.7 FL — SIGNIFICANT CHANGE UP (ref 80–100)
MCV RBC AUTO: 93.9 FL — SIGNIFICANT CHANGE UP (ref 80–100)
MONOCYTES # BLD AUTO: 0.76 K/UL — SIGNIFICANT CHANGE UP (ref 0–0.9)
MONOCYTES NFR BLD AUTO: 13.7 % — SIGNIFICANT CHANGE UP (ref 2–14)
NEUTROPHILS # BLD AUTO: 3.32 K/UL — SIGNIFICANT CHANGE UP (ref 1.8–7.4)
NEUTROPHILS NFR BLD AUTO: 59.8 % — SIGNIFICANT CHANGE UP (ref 43–77)
NON HDL CHOLESTEROL: 67 MG/DL — SIGNIFICANT CHANGE UP
PLATELET # BLD AUTO: 194 K/UL — SIGNIFICANT CHANGE UP (ref 150–400)
PLATELET # BLD AUTO: 203 K/UL — SIGNIFICANT CHANGE UP (ref 150–400)
PLATELET # BLD AUTO: 203 K/UL — SIGNIFICANT CHANGE UP (ref 150–400)
POTASSIUM SERPL-MCNC: 4.5 MMOL/L — SIGNIFICANT CHANGE UP (ref 3.5–5.3)
POTASSIUM SERPL-SCNC: 4.5 MMOL/L — SIGNIFICANT CHANGE UP (ref 3.5–5.3)
PROT SERPL-MCNC: 6.6 G/DL — SIGNIFICANT CHANGE UP (ref 6.6–8.7)
RBC # BLD: 3.69 M/UL — LOW (ref 4.2–5.8)
RBC # BLD: 3.79 M/UL — LOW (ref 4.2–5.8)
RBC # BLD: 3.8 M/UL — LOW (ref 4.2–5.8)
RBC # FLD: 12.9 % — SIGNIFICANT CHANGE UP (ref 10.3–14.5)
RBC # FLD: 12.9 % — SIGNIFICANT CHANGE UP (ref 10.3–14.5)
RBC # FLD: 13 % — SIGNIFICANT CHANGE UP (ref 10.3–14.5)
SODIUM SERPL-SCNC: 134 MMOL/L — LOW (ref 135–145)
TRIGL SERPL-MCNC: 40 MG/DL — SIGNIFICANT CHANGE UP
WBC # BLD: 5.55 K/UL — SIGNIFICANT CHANGE UP (ref 3.8–10.5)
WBC # BLD: 5.74 K/UL — SIGNIFICANT CHANGE UP (ref 3.8–10.5)
WBC # BLD: 6.28 K/UL — SIGNIFICANT CHANGE UP (ref 3.8–10.5)
WBC # FLD AUTO: 5.55 K/UL — SIGNIFICANT CHANGE UP (ref 3.8–10.5)
WBC # FLD AUTO: 5.74 K/UL — SIGNIFICANT CHANGE UP (ref 3.8–10.5)
WBC # FLD AUTO: 6.28 K/UL — SIGNIFICANT CHANGE UP (ref 3.8–10.5)

## 2023-04-01 PROCEDURE — C1760: CPT

## 2023-04-01 PROCEDURE — 85025 COMPLETE CBC W/AUTO DIFF WBC: CPT

## 2023-04-01 PROCEDURE — 93926 LOWER EXTREMITY STUDY: CPT

## 2023-04-01 PROCEDURE — 92978 ENDOLUMINL IVUS OCT C 1ST: CPT | Mod: LD

## 2023-04-01 PROCEDURE — 83036 HEMOGLOBIN GLYCOSYLATED A1C: CPT

## 2023-04-01 PROCEDURE — C1769: CPT

## 2023-04-01 PROCEDURE — C1753: CPT

## 2023-04-01 PROCEDURE — C9600: CPT | Mod: LD

## 2023-04-01 PROCEDURE — 93005 ELECTROCARDIOGRAM TRACING: CPT

## 2023-04-01 PROCEDURE — 82947 ASSAY GLUCOSE BLOOD QUANT: CPT

## 2023-04-01 PROCEDURE — C1725: CPT

## 2023-04-01 PROCEDURE — 86900 BLOOD TYPING SEROLOGIC ABO: CPT

## 2023-04-01 PROCEDURE — 80053 COMPREHEN METABOLIC PANEL: CPT

## 2023-04-01 PROCEDURE — 82962 GLUCOSE BLOOD TEST: CPT

## 2023-04-01 PROCEDURE — 83735 ASSAY OF MAGNESIUM: CPT

## 2023-04-01 PROCEDURE — 85576 BLOOD PLATELET AGGREGATION: CPT

## 2023-04-01 PROCEDURE — 86850 RBC ANTIBODY SCREEN: CPT

## 2023-04-01 PROCEDURE — C1874: CPT

## 2023-04-01 PROCEDURE — 80061 LIPID PANEL: CPT

## 2023-04-01 PROCEDURE — 93010 ELECTROCARDIOGRAM REPORT: CPT

## 2023-04-01 PROCEDURE — 93926 LOWER EXTREMITY STUDY: CPT | Mod: 26,RT

## 2023-04-01 PROCEDURE — 86901 BLOOD TYPING SEROLOGIC RH(D): CPT

## 2023-04-01 PROCEDURE — C1894: CPT

## 2023-04-01 PROCEDURE — 80048 BASIC METABOLIC PNL TOTAL CA: CPT

## 2023-04-01 PROCEDURE — 85027 COMPLETE CBC AUTOMATED: CPT

## 2023-04-01 PROCEDURE — C9601: CPT | Mod: LD

## 2023-04-01 PROCEDURE — C1887: CPT

## 2023-04-01 PROCEDURE — 36415 COLL VENOUS BLD VENIPUNCTURE: CPT

## 2023-04-01 RX ORDER — PRASUGREL 5 MG/1
1 TABLET, FILM COATED ORAL
Qty: 0 | Refills: 0 | DISCHARGE
Start: 2023-04-01

## 2023-04-01 RX ORDER — CLOPIDOGREL BISULFATE 75 MG/1
1 TABLET, FILM COATED ORAL
Refills: 0 | DISCHARGE

## 2023-04-01 RX ORDER — METFORMIN HYDROCHLORIDE 850 MG/1
1 TABLET ORAL
Qty: 0 | Refills: 0 | DISCHARGE

## 2023-04-01 RX ADMIN — PRASUGREL 10 MILLIGRAM(S): 5 TABLET, FILM COATED ORAL at 11:14

## 2023-04-01 RX ADMIN — Medication 81 MILLIGRAM(S): at 11:14

## 2023-04-01 NOTE — DISCHARGE NOTE NURSING/CASE MANAGEMENT/SOCIAL WORK - PATIENT PORTAL LINK FT
You can access the FollowMyHealth Patient Portal offered by [facility] by registering at the following website: [email or web address]. By joining [facility] FollowMyHealth portal, you will also be able to view your health information using other applications (apps) compatible with our system.

## 2023-04-01 NOTE — PROGRESS NOTE ADULT - SUBJECTIVE AND OBJECTIVE BOX
I have seen and examined this patient. There is no change since the last H& P. Consent obtained. Agree with cardiac cath. Risks and benefits fully explained. All questions answered.    Kvng Kraft MD
                                                                         Department of Cardiology                                                                  [facility]/[place] [address] [address] [facility]                                                            Telephone: [phone]. Fax:[phone]                                                    Post- Procedure Note: Left Heart Cardiac Catheterization       Narrative:  68y  Male now s/p BECCA X 1 to mLAD and BECCA X 2 to Diag, BECCA X 2 to Diag via RFA, procedure performed by Dr. Kraft, received Plavix load, heparin for A/C, Fentanyl and Versed IV intraprocedurally, arrived to recovery in NAD and HDS, Post PCI ECG with no acute changes, SB/SR 40-50's on tele, asymptomatic, avoid AVN blockers for now, RFA closed with perclose, access site stable, no bleed/hematoma, distal pulse +, plan for overnight admission.         PAST MEDICAL & SURGICAL HISTORY:    Home Medications:  Aspir 81 oral delayed release tablet: 1 orally once a day (31 Mar 2023 09:28)  atorvastatin 40 mg oral tablet: 1 orally once a day (31 Mar 2023 09:28)  metFORMIN 1000 mg oral tablet: 1 orally 2 times a day (31 Mar 2023 09:28)  Effient 60mg po load>>10mg po daily     No Known Allergies      Objective:  Vital Signs Last 24 Hrs  T(C): 36.8 (31 Mar 2023 09:38), Max: 36.8 (31 Mar 2023 09:38)  T(F): 98.2 (31 Mar 2023 09:38), Max: 98.2 (31 Mar 2023 09:38)  HR: 43 (31 Mar 2023 14:40) (42 - 62)  BP: 119/60 (31 Mar 2023 14:40) (111/60 - 143/63)  BP(mean): --  RR: 16 (31 Mar 2023 14:40) (14 - 20)  SpO2: 99% (31 Mar 2023 14:40) (98% - 100%)    Parameters below as of 31 Mar 2023 12:55  Patient On (Oxygen Delivery Method): room air    PHYSICAL EXAM:  Constitutional: A & O x 3, NAD  HEENT:  Normal oral mucosa, PERRL, EOMI	  Cardiovascular: S1 S2, No murmurs, No JVD  Respiratory: Lungs clear to auscultation	  Gastrointestinal:  Soft, Non-tender, + BS	  Skin: No rashes or cyanosis  Neurologic: No deficit appreciated  Extremities: Normal range of motion, no edema  Vascular: Access site stable, no bleed or hematoma, distal pulses +     Labs:                           13.8   4.88  )-----------( 243      ( 31 Mar 2023 09:42 )             41.8     03-31    141  |  102  |  13.5  ----------------------------<  181<H>  4.5   |  25.0  |  1.10    Ca    9.7      31 Mar 2023 09:42        Assessment: 67yo male with h/o HLD, DM, prior COVID, experiencing exertional chest pressure and palpitations, referred to Dr Snell and NST with basal anetroseptal ischemia and +TID with ratio 1.36, referred to Dr Kraft and underwent LHC 3/13/23 with significant 1V CAD (mLAD 80% and Diag 80%) no PCI at that time and now presented for stage PCI to LAD/Diag by Dr Kraft.     Now s/p BECCA X 1 to mLAD and BECCA X 2 to Diag, BECCA X 2 to Diag via RFA, procedure performed by Dr. Kraft, received Plavix load, heparin for A/C, Fentanyl and Versed IV intraprocedurally, arrived to recovery in NAD and HDS, Post PCI ECG with no acute changes, SB/SR 40-50's on tele, asymptomatic, avoid AVN blockers for now, RFA closed with perclose, access site stable, no bleed/hematoma, distal pulse +, plan for overnight admission.       P2Y12: 256, discussed with Dr Kraft and Plavix D/C'd and given Effient 60mg po load and will cont Effient 10mg po daily in addition to daily aspirin 81mg po daily.    Effient script sent to [facility], approved and covered by insurance, can  upon discharge tomorrow am and will be given a 90day supply with 3 refils and also given a paper script.    Plan:  -Formal cath report pending  -Post procedure management/monitoring per protocol  -Access site precautions  -Bedrest x 3hours post procedure  -Labs and EKG in am  -Repeat ECG if any clinical indication or change on tele  -NS 250mL bolus post cath   -Continue current medical therapy  -Dual anti platelet therapy with aspirin 81mg po daily and Effient 10mg po daily  -Cont statin therapy with Lipitor 40mg po qHS, consider increasing pending am lipid panel  -Hold metformin X 48hrs  -F/S QID with correction scale coverage while in-patient  -Educated regarding strict adherence with DAPT   -Educated regarding post procedure management and care  -Discussed the importance of RF modification  -Cardiac rehab info provided/referral and communication to cardiac rehab completed  -F/U outpt in 1-2 weeks with Cardiologist Dr. Snell   -DISPO: Plan for D/C in am if remains HDS, ECG and labs in am stable and without complications    
                                                                        Department of Cardiology                                                                  [facility]/[place] [address] [address] [facility]                                                            Telephone: [phone]. Fax:[phone]                                                                             Progress Note      HPI:    67yo male with h/o HLD, DM, prior COVID, experiencing exertional chest pressure and palpitations, referred to Dr Snell and NST with basal anetroseptal ischemia and +TID with ratio 1.36, referred to Dr Kraft and underwent C 3/13/23 with significant 1V CAD (mLAD 80% and Diag 80%) no PCI at that time and now presented for stage PCI to LAD/Diag by Dr Kraft.     Now POD #1s/p BECCA X 1 to mLAD and BECCA X 2 to Diag, BECCA X 2 to Diag via RFA, procedure performed by Dr. Kraft.  SB/SR 40-50's on tele, asymptomatic, avoid AVN blockers for now.  RFA closed with perclose, access site with hematoma and edematous.  CBC decreased and repeated with further drop.  Pt with c/o pain upon palpitation to site        MEDICATIONS:  acetaminophen   IVPB .. 1000 milliGRAM(s) IV Intermittent once  atorvastatin 40 milliGRAM(s) Oral at bedtime  dextrose 50% Injectable 25 Gram(s) IV Push once  dextrose 50% Injectable 12.5 Gram(s) IV Push once  dextrose 50% Injectable 25 Gram(s) IV Push once  dextrose Oral Gel 15 Gram(s) Oral once PRN  glucagon  Injectable 1 milliGRAM(s) IntraMuscular once  insulin lispro (ADMELOG) corrective regimen sliding scale   SubCutaneous three times a day before meals  insulin lispro (ADMELOG) corrective regimen sliding scale   SubCutaneous at bedtime  aspirin  chewable 81 milliGRAM(s) Oral daily  chlorhexidine 4% Liquid 1 Application(s) Topical Once  dextrose 5%. 1000 milliLiter(s) IV Continuous <Continuous>  dextrose 5%. 1000 milliLiter(s) IV Continuous <Continuous>  prasugrel 10 milliGRAM(s) Oral daily        ROS: as stated above, otherwise negative    PHYSICAL EXAM:  Constitutional: A & O x 3  HEENT:   Normal oral mucosa, PERRL, EOMI	  Cardiovascular: Normal S1 S2, No JVD, No murmurs, No edema  Respiratory: Lungs clear to auscultation	  Gastrointestinal:  Soft, Non-tender, + BS	  Skin: No rashes, No ecchymoses, No cyanosis  Neurologic: Non-focal  Extremities: Normal range of motion, No clubbing, cyanosis or edema  Vascular: Peripheral pulses palpable 2+ bilaterally  Groin site:  + hematoma, + edema.  Pain upon palpitation. No bleeding.      T(C): 36.4 (04-01-23 @ 06:15), Max: 36.8 (03-31-23 @ 09:38)  HR: 65 (04-01-23 @ 06:15) (42 - 65)  BP: 110/58 (04-01-23 @ 06:15) (110/58 - 145/66)  RR: 17 (04-01-23 @ 06:15) (14 - 20)  SpO2: 98% (04-01-23 @ 06:15) (97% - 100%)  Wt(kg): --        TELEMETRY: 	  SB    ECG:  	NSR no acute changes    LABS:	 	                          11.7   5.74  )-----------( 194      ( 01 Apr 2023 07:20 )             34.5     04-01    134<L>  |  101  |  19.1  ----------------------------<  137<H>  4.5   |  22.0  |  1.02    Ca    9.3      01 Apr 2023 06:10  Mg     1.9     04-01    TPro  6.6  /  Alb  3.8  /  TBili  0.5  /  DBili  x   /  AST  42<H>  /  ALT  37  /  AlkPhos  66  04-01    HgA1c: 9.1

## 2023-04-01 NOTE — DISCHARGE NOTE NURSING/CASE MANAGEMENT/SOCIAL WORK - NSDCPEFALRISK_GEN_ALL_CORE
For information on Fall & Injury Prevention, visit: [email or web address] OR  [email or web address] OR  https://www.cdc.gov/steadi/patient.html

## 2023-04-01 NOTE — PROGRESS NOTE ADULT - ASSESSMENT
POD #1 s/p PCI to Diag and mLAD.  Right groin with hematoma.  US to r/o pseudo ordered and pending.    -US pending  -cont to hold AVN d/t bradycardia   POD #1 s/p PCI to Diag and mLAD.  Right groin with hematoma.  US to r/o pseudo ordered and pending.    -US pending  -cont to hold AVN d/t bradycardia  -Pt was informed of elevated HgA1C pt will f/u with primary doctor  -CBC repeated and stable.  -Dr. Ohara aware of CBC and US.    -Will discharge if CBC and US stable

## 2023-05-23 ENCOUNTER — APPOINTMENT (OUTPATIENT)
Dept: GASTROENTEROLOGY | Facility: GI CENTER | Age: 68
End: 2023-05-23

## 2024-02-15 ENCOUNTER — OFFICE (OUTPATIENT)
Dept: URBAN - METROPOLITAN AREA CLINIC 94 | Facility: CLINIC | Age: 69
Setting detail: OPHTHALMOLOGY
End: 2024-02-15
Payer: COMMERCIAL

## 2024-02-15 DIAGNOSIS — E11.9: ICD-10-CM

## 2024-02-15 DIAGNOSIS — H35.033: ICD-10-CM

## 2024-02-15 DIAGNOSIS — H25.13: ICD-10-CM

## 2024-02-15 DIAGNOSIS — H43.811: ICD-10-CM

## 2024-02-15 PROCEDURE — 92250 FUNDUS PHOTOGRAPHY W/I&R: CPT | Performed by: OPTOMETRIST

## 2024-02-15 PROCEDURE — 92014 COMPRE OPH EXAM EST PT 1/>: CPT | Performed by: OPTOMETRIST

## 2024-02-15 ASSESSMENT — REFRACTION_AUTOREFRACTION
OD_CYLINDER: -1.00
OS_SPHERE: +0.50
OD_AXIS: 105
OD_SPHERE: +0.50
OS_AXIS: 090
OS_CYLINDER: -1.00

## 2024-02-15 ASSESSMENT — REFRACTION_MANIFEST
OS_VA1: 20/20
OS_ADD: +2.25
OD_VA1: 20/20-
OS_ADD: +2.25
OS_AXIS: 165
OS_AXIS: 090
OS_AXIS: 165
OD_AXIS: 100
OD_VA1: 20/25-1
OS_SPHERE: +0.50
OD_SPHERE: -0.25
OD_ADD: +2.25
OD_AXIS: 175
OD_CYLINDER: -0.50
OD_VA1: 20/20
OS_SPHERE: -1.00
OS_SPHERE: +0.50
OD_SPHERE: +0.50
OD_AXIS: 095
OD_VA1: 20/20-
OD_AXIS: 175
OS_CYLINDER: +0.50
OS_ADD: +2.25
OS_AXIS: 085
OD_ADD: +2.25
OD_CYLINDER: +1.00
OD_CYLINDER: -0.50
OD_SPHERE: -0.25
OD_CYLINDER: +1.00
OS_VA1: 20/25-
OD_ADD: +2.25
OS_CYLINDER: -0.50
OS_CYLINDER: -0.50
OS_VA1: 20/25-
OS_CYLINDER: +0.50
OS_VA1: 20/25
OS_SPHERE: -1.00
OD_SPHERE: +0.50

## 2024-02-15 ASSESSMENT — SPHEQUIV_DERIVED
OD_SPHEQUIV: 0.25
OD_SPHEQUIV: 0.25
OS_SPHEQUIV: -0.75
OD_SPHEQUIV: 0.25
OS_SPHEQUIV: 0
OD_SPHEQUIV: 0
OD_SPHEQUIV: 0.25
OS_SPHEQUIV: 0.25
OS_SPHEQUIV: -0.75
OS_SPHEQUIV: 0.25

## 2024-02-15 ASSESSMENT — REFRACTION_CURRENTRX
OS_OVR_VA: 20/
OS_SPHERE: +1.50
OD_OVR_VA: 20/
OS_AXIS: 080
OD_SPHERE: +3.00
OS_CYLINDER: -0.50
OS_VPRISM_DIRECTION: SV
OS_AXIS: 075
OD_CYLINDER: -1.00
OS_VPRISM_DIRECTION: SV
OD_CYLINDER: SPH
OS_CYLINDER: -0.75
OD_CYLINDER: -1.00
OD_SPHERE: +1.50
OD_AXIS: 090
OD_SPHERE: +2.75
OS_OVR_VA: 20/
OD_OVR_VA: 20/
OS_CYLINDER: -0.50
OD_SPHERE: +0.50
OD_OVR_VA: 20/
OS_OVR_VA: 20/
OS_SPHERE: +1.50
OD_VPRISM_DIRECTION: SV
OD_VPRISM_DIRECTION: SV
OS_SPHERE: +1.75
OD_AXIS: 085
OS_SPHERE: +1.75
OS_AXIS: 060

## 2024-02-15 ASSESSMENT — CONFRONTATIONAL VISUAL FIELD TEST (CVF)
OS_FINDINGS: FULL
OD_FINDINGS: FULL

## 2024-02-15 ASSESSMENT — SUPERFICIAL PUNCTATE KERATITIS (SPK): OS_SPK: T

## 2024-06-26 ENCOUNTER — APPOINTMENT (OUTPATIENT)
Dept: GASTROENTEROLOGY | Facility: CLINIC | Age: 69
End: 2024-06-26
Payer: MEDICARE

## 2024-06-26 VITALS
DIASTOLIC BLOOD PRESSURE: 70 MMHG | SYSTOLIC BLOOD PRESSURE: 120 MMHG | HEART RATE: 70 BPM | OXYGEN SATURATION: 98 % | RESPIRATION RATE: 16 BRPM | HEIGHT: 67 IN | BODY MASS INDEX: 20.56 KG/M2 | WEIGHT: 131 LBS

## 2024-06-26 DIAGNOSIS — Z12.11 ENCOUNTER FOR SCREENING FOR MALIGNANT NEOPLASM OF COLON: ICD-10-CM

## 2024-06-26 PROCEDURE — 99204 OFFICE O/P NEW MOD 45 MIN: CPT

## 2024-06-26 RX ORDER — GLIPIZIDE 2.5 MG/1
TABLET ORAL
Refills: 0 | Status: ACTIVE | COMMUNITY

## 2024-06-26 RX ORDER — PRASUGREL 10 MG/1
TABLET, FILM COATED ORAL
Refills: 0 | Status: ACTIVE | COMMUNITY

## 2024-06-26 RX ORDER — EMPAGLIFLOZIN 25 MG/1
TABLET, FILM COATED ORAL
Refills: 0 | Status: ACTIVE | COMMUNITY

## 2024-07-31 ENCOUNTER — APPOINTMENT (OUTPATIENT)
Dept: UROLOGY | Facility: CLINIC | Age: 69
End: 2024-07-31

## 2024-08-02 ENCOUNTER — APPOINTMENT (OUTPATIENT)
Dept: GASTROENTEROLOGY | Facility: GI CENTER | Age: 69
End: 2024-08-02

## 2024-08-15 ENCOUNTER — TRANSCRIPTION ENCOUNTER (OUTPATIENT)
Age: 69
End: 2024-08-15

## 2024-08-16 ENCOUNTER — RESULT REVIEW (OUTPATIENT)
Age: 69
End: 2024-08-16

## 2024-08-16 ENCOUNTER — APPOINTMENT (OUTPATIENT)
Dept: GASTROENTEROLOGY | Facility: GI CENTER | Age: 69
End: 2024-08-16
Payer: MEDICARE

## 2024-08-16 DIAGNOSIS — K63.5 POLYP OF COLON: ICD-10-CM

## 2024-08-16 DIAGNOSIS — Z12.11 ENCOUNTER FOR SCREENING FOR MALIGNANT NEOPLASM OF COLON: ICD-10-CM

## 2024-08-16 PROCEDURE — 45380 COLONOSCOPY AND BIOPSY: CPT | Mod: PT

## 2024-08-16 NOTE — PHYSICAL EXAM
[Alert] : alert [Normal Voice/Communication] : normal voice/communication [No Acute Distress] : no acute distress [Sclera] : the sclera and conjunctiva were normal [Hearing Threshold Finger Rub Not [place]] : hearing was normal [Normal Lips/Gums] : the lips and gums were normal [Oropharynx] : the oropharynx was normal [Normal Appearance] : the appearance of the neck was normal [No Neck Mass] : no neck mass was observed [No Respiratory Distress] : no respiratory distress [No Acc Muscle Use] : no accessory muscle use [Respiration, Rhythm And Depth] : normal respiratory rhythm and effort [Auscultation Breath Sounds / Voice Sounds] : lungs were clear to auscultation bilaterally [Heart Rate And Rhythm] : heart rate was normal and rhythm regular [None] : no edema [Bowel Sounds] : normal bowel sounds [Abdomen Tenderness] : non-tender [No Masses] : no abdominal mass palpated [Abdomen Soft] : soft [No CVA Tenderness] : no CVA  tenderness [Normal Color / Pigmentation] : normal skin color and pigmentation [No Focal Deficits] : no focal deficits [Oriented To Time, Place, And Person] : oriented to person, place, and time

## 2024-08-16 NOTE — REVIEW OF SYSTEMS
[Fever] : no fever [Chills] : no chills [Feeling Tired] : not feeling tired [Recent Weight Loss (___ Lbs)] : no recent weight loss [Shortness Of Breath] : shortness of breath [Abdominal Pain] : no abdominal pain [Vomiting] : no vomiting [Constipation] : no constipation [Diarrhea] : no diarrhea [Heartburn] : no heartburn [Melena (black stool)] : no melena [Swollen Glands] : no swollen glands [Negative] : Endocrine

## 2025-02-06 ENCOUNTER — OFFICE (OUTPATIENT)
Dept: URBAN - METROPOLITAN AREA CLINIC 94 | Facility: CLINIC | Age: 70
Setting detail: OPHTHALMOLOGY
End: 2025-02-06
Payer: MEDICARE

## 2025-02-06 DIAGNOSIS — H43.811: ICD-10-CM

## 2025-02-06 DIAGNOSIS — H35.033: ICD-10-CM

## 2025-02-06 DIAGNOSIS — H25.13: ICD-10-CM

## 2025-02-06 PROCEDURE — 92250 FUNDUS PHOTOGRAPHY W/I&R: CPT | Performed by: OPTOMETRIST

## 2025-02-06 PROCEDURE — 99213 OFFICE O/P EST LOW 20 MIN: CPT | Performed by: OPTOMETRIST

## 2025-02-06 ASSESSMENT — REFRACTION_MANIFEST
OS_AXIS: 165
OS_AXIS: 090
OS_CYLINDER: +0.50
OD_AXIS: 175
OS_VA1: 20/20
OD_SPHERE: +0.50
OD_CYLINDER: +1.00
OD_SPHERE: +0.50
OD_AXIS: 100
OD_CYLINDER: +1.00
OS_SPHERE: +0.50
OD_CYLINDER: -0.50
OS_SPHERE: +0.50
OS_ADD: +2.25
OS_CYLINDER: +0.50
OD_AXIS: 095
OS_CYLINDER: -0.50
OS_VA1: 20/25
OD_SPHERE: -0.25
OS_CYLINDER: -0.50
OD_VA1: 20/25-1
OS_ADD: +2.25
OS_ADD: +2.25
OS_CYLINDER: -0.50
OD_VA1: 20/20-
OS_AXIS: 085
OS_AXIS: 165
OD_ADD: +2.25
OD_SPHERE: +0.50
OS_ADD: +2.25
OD_ADD: +2.25
OD_ADD: +2.25
OD_AXIS: 095
OS_AXIS: 085
OS_VA1: 20/25
OD_VA1: 20/20
OD_CYLINDER: -0.50
OS_VA1: 20/25-
OD_ADD: +2.25
OS_SPHERE: -1.00
OD_VA1: 20/20-
OD_SPHERE: -0.25
OD_CYLINDER: -0.50
OS_VA1: 20/25-
OS_SPHERE: -1.00
OS_SPHERE: +0.50
OD_AXIS: 175
OD_VA1: 20/25-1

## 2025-02-06 ASSESSMENT — REFRACTION_CURRENTRX
OS_SPHERE: +2.75
OD_OVR_VA: 20/
OS_VPRISM_DIRECTION: SV
OS_OVR_VA: 20/
OD_SPHERE: +3.00
OD_AXIS: 085
OS_AXIS: 095
OD_SPHERE: +0.50
OS_SPHERE: +1.75
OD_SPHERE: +1.50
OS_OVR_VA: 20/
OD_CYLINDER: -1.00
OD_SPHERE: +2.75
OD_CYLINDER: -1.00
OD_OVR_VA: 20/
OD_AXIS: 105
OS_AXIS: 080
OS_AXIS: 075
OS_VPRISM_DIRECTION: SV
OD_VPRISM_DIRECTION: SV
OS_SPHERE: +1.75
OD_CYLINDER: -0.25
OD_CYLINDER: SPH
OD_OVR_VA: 20/
OS_CYLINDER: -0.75
OS_VPRISM_DIRECTION: SV
OD_SPHERE: +2.25
OS_CYLINDER: -0.50
OS_OVR_VA: 20/
OS_CYLINDER: -0.50
OS_SPHERE: +1.50
OS_SPHERE: +1.50
OS_CYLINDER: -0.50
OD_AXIS: 090
OD_VPRISM_DIRECTION: SV
OS_AXIS: 060
OD_VPRISM_DIRECTION: SV

## 2025-02-06 ASSESSMENT — REFRACTION_AUTOREFRACTION
OS_SPHERE: +0.75
OD_AXIS: 100
OD_CYLINDER: -1.25
OS_CYLINDER: -1.00
OS_AXIS: 090
OD_SPHERE: +1.00

## 2025-02-06 ASSESSMENT — KERATOMETRY
OD_AXISANGLE_DEGREES: 030
OD_K1POWER_DIOPTERS: 42.50
OD_K2POWER_DIOPTERS: 43.00
OS_K1POWER_DIOPTERS: 43.00
OS_AXISANGLE_DEGREES: 090
OS_K2POWER_DIOPTERS: 43.00

## 2025-02-06 ASSESSMENT — VISUAL ACUITY
OS_BCVA: 20/30-
OD_BCVA: 20/30

## 2025-02-06 ASSESSMENT — CONFRONTATIONAL VISUAL FIELD TEST (CVF)
OD_FINDINGS: FULL
OS_FINDINGS: FULL

## 2025-02-06 ASSESSMENT — SUPERFICIAL PUNCTATE KERATITIS (SPK): OS_SPK: T

## 2025-02-06 ASSESSMENT — TONOMETRY: OS_IOP_MMHG: 21

## 2025-03-31 ENCOUNTER — OFFICE (OUTPATIENT)
Dept: URBAN - METROPOLITAN AREA CLINIC 116 | Facility: CLINIC | Age: 70
Setting detail: OPHTHALMOLOGY
End: 2025-03-31
Payer: MEDICARE

## 2025-03-31 DIAGNOSIS — H20.011: ICD-10-CM

## 2025-03-31 PROCEDURE — 99212 OFFICE O/P EST SF 10 MIN: CPT | Performed by: OPTOMETRIST

## 2025-03-31 ASSESSMENT — REFRACTION_MANIFEST
OD_VA1: 20/25-1
OD_VA1: 20/20-
OD_SPHERE: +0.50
OS_SPHERE: -1.00
OS_CYLINDER: +0.50
OS_SPHERE: -1.00
OD_SPHERE: -0.25
OS_CYLINDER: -0.50
OS_SPHERE: +0.50
OD_SPHERE: +0.50
OD_CYLINDER: +1.00
OD_VA1: 20/20-
OS_VA1: 20/25
OD_SPHERE: +0.50
OS_AXIS: 090
OS_CYLINDER: -0.50
OD_AXIS: 175
OS_CYLINDER: +0.50
OD_VA1: 20/25-1
OD_ADD: +2.25
OD_ADD: +2.25
OD_AXIS: 095
OD_CYLINDER: -0.50
OS_VA1: 20/20
OD_CYLINDER: -0.50
OS_SPHERE: +0.50
OS_AXIS: 165
OS_AXIS: 085
OS_ADD: +2.25
OS_CYLINDER: -0.50
OD_VA1: 20/20
OD_CYLINDER: +1.00
OS_ADD: +2.25
OS_VA1: 20/25
OS_ADD: +2.25
OD_ADD: +2.25
OD_CYLINDER: -0.50
OD_AXIS: 095
OS_ADD: +2.25
OS_SPHERE: +0.50
OD_AXIS: 100
OD_ADD: +2.25
OS_AXIS: 085
OD_SPHERE: -0.25
OD_AXIS: 175
OS_VA1: 20/25-
OS_AXIS: 165
OS_VA1: 20/25-

## 2025-03-31 ASSESSMENT — REFRACTION_CURRENTRX
OS_SPHERE: +1.75
OS_SPHERE: +1.75
OD_AXIS: 085
OD_CYLINDER: SPH
OD_AXIS: 105
OS_AXIS: 095
OS_VPRISM_DIRECTION: SV
OS_AXIS: 075
OD_CYLINDER: -1.00
OS_SPHERE: +2.75
OD_VPRISM_DIRECTION: SV
OS_VPRISM_DIRECTION: SV
OD_VPRISM_DIRECTION: SV
OS_OVR_VA: 20/
OD_OVR_VA: 20/
OD_CYLINDER: -1.00
OS_AXIS: 080
OS_AXIS: 060
OS_SPHERE: +1.50
OD_AXIS: 090
OD_SPHERE: +2.25
OS_CYLINDER: -0.50
OD_SPHERE: +0.50
OS_CYLINDER: -0.75
OS_SPHERE: +1.50
OS_CYLINDER: -0.50
OD_SPHERE: +3.00
OS_OVR_VA: 20/
OS_OVR_VA: 20/
OD_SPHERE: +1.50
OS_CYLINDER: -0.50
OD_CYLINDER: -0.25
OD_OVR_VA: 20/
OD_VPRISM_DIRECTION: SV
OD_SPHERE: +2.75
OD_OVR_VA: 20/
OS_VPRISM_DIRECTION: SV

## 2025-03-31 ASSESSMENT — CONFRONTATIONAL VISUAL FIELD TEST (CVF)
OD_FINDINGS: FULL
OS_FINDINGS: FULL

## 2025-03-31 ASSESSMENT — VISUAL ACUITY
OD_BCVA: 20/30
OS_BCVA: 20/30-

## 2025-03-31 ASSESSMENT — TONOMETRY: OS_IOP_MMHG: 20

## 2025-04-02 ENCOUNTER — OFFICE (OUTPATIENT)
Dept: URBAN - METROPOLITAN AREA CLINIC 115 | Facility: CLINIC | Age: 70
Setting detail: OPHTHALMOLOGY
End: 2025-04-02
Payer: MEDICARE

## 2025-04-02 DIAGNOSIS — H16.223: ICD-10-CM

## 2025-04-02 DIAGNOSIS — H35.033: ICD-10-CM

## 2025-04-02 DIAGNOSIS — H25.13: ICD-10-CM

## 2025-04-02 DIAGNOSIS — H20.011: ICD-10-CM

## 2025-04-02 DIAGNOSIS — E11.9: ICD-10-CM

## 2025-04-02 DIAGNOSIS — H43.811: ICD-10-CM

## 2025-04-02 DIAGNOSIS — H43.392: ICD-10-CM

## 2025-04-02 PROCEDURE — 92014 COMPRE OPH EXAM EST PT 1/>: CPT | Performed by: OPHTHALMOLOGY

## 2025-04-02 PROCEDURE — 92250 FUNDUS PHOTOGRAPHY W/I&R: CPT | Performed by: OPHTHALMOLOGY

## 2025-04-02 ASSESSMENT — REFRACTION_MANIFEST
OS_ADD: +2.25
OD_CYLINDER: +1.00
OD_CYLINDER: -0.50
OS_ADD: +2.25
OS_SPHERE: -1.00
OD_CYLINDER: -0.50
OS_SPHERE: +0.50
OD_SPHERE: -0.25
OS_CYLINDER: +0.50
OS_CYLINDER: -0.50
OS_SPHERE: -1.00
OS_AXIS: 165
OD_SPHERE: +0.50
OD_AXIS: 175
OD_CYLINDER: -0.50
OD_ADD: +2.25
OS_SPHERE: +0.50
OS_CYLINDER: -0.50
OS_CYLINDER: +0.50
OD_CYLINDER: +1.00
OD_AXIS: 095
OS_AXIS: 085
OD_ADD: +2.25
OD_AXIS: 175
OD_VA1: 20/20-
OD_VA1: 20/20
OS_VA1: 20/25
OD_ADD: +2.25
OD_VA1: 20/25-1
OD_SPHERE: +0.50
OS_CYLINDER: -0.50
OD_ADD: +2.25
OS_ADD: +2.25
OD_VA1: 20/25-1
OD_SPHERE: -0.25
OS_ADD: +2.25
OS_VA1: 20/25-
OS_VA1: 20/20
OS_VA1: 20/25
OD_VA1: 20/20-
OS_AXIS: 165
OS_VA1: 20/25-
OD_SPHERE: +0.50
OS_SPHERE: +0.50
OD_AXIS: 095
OD_AXIS: 100
OS_AXIS: 090
OS_AXIS: 085

## 2025-04-02 ASSESSMENT — REFRACTION_CURRENTRX
OS_CYLINDER: -0.50
OD_AXIS: 090
OD_AXIS: 085
OD_VPRISM_DIRECTION: SV
OS_OVR_VA: 20/
OD_OVR_VA: 20/
OD_CYLINDER: SPH
OS_AXIS: 060
OD_SPHERE: +1.50
OS_OVR_VA: 20/
OD_OVR_VA: 20/
OS_SPHERE: +2.75
OD_CYLINDER: -0.25
OD_CYLINDER: -1.00
OD_AXIS: 105
OD_OVR_VA: 20/
OS_SPHERE: +1.75
OS_CYLINDER: -0.50
OD_SPHERE: +3.00
OS_CYLINDER: -0.75
OS_SPHERE: +1.50
OS_SPHERE: +1.50
OD_SPHERE: +2.25
OS_VPRISM_DIRECTION: SV
OS_AXIS: 080
OS_AXIS: 095
OD_SPHERE: +0.50
OS_AXIS: 075
OS_VPRISM_DIRECTION: SV
OD_CYLINDER: -1.00
OS_VPRISM_DIRECTION: SV
OD_VPRISM_DIRECTION: SV
OD_SPHERE: +2.75
OS_CYLINDER: -0.50
OS_SPHERE: +1.75
OS_OVR_VA: 20/
OD_VPRISM_DIRECTION: SV

## 2025-04-02 ASSESSMENT — REFRACTION_AUTOREFRACTION
OS_CYLINDER: -0.50
OS_AXIS: 082
OS_SPHERE: +0.50
OD_AXIS: 092
OD_CYLINDER: -0.50
OD_SPHERE: +0.75

## 2025-04-02 ASSESSMENT — TONOMETRY
OS_IOP_MMHG: 20
OD_IOP_MMHG: 16
OS_IOP_MMHG: 14

## 2025-04-02 ASSESSMENT — SUPERFICIAL PUNCTATE KERATITIS (SPK)
OD_SPK: T
OS_SPK: T

## 2025-04-02 ASSESSMENT — KERATOMETRY
OD_K1POWER_DIOPTERS: 42.50
OS_K1POWER_DIOPTERS: 43.25
OD_AXISANGLE_DEGREES: 168
OS_AXISANGLE_DEGREES: 108
OS_K2POWER_DIOPTERS: 43.50
OD_K2POWER_DIOPTERS: 42.75

## 2025-04-02 ASSESSMENT — VISUAL ACUITY
OS_BCVA: 20/30-
OD_BCVA: 20/30

## 2025-04-02 ASSESSMENT — CONFRONTATIONAL VISUAL FIELD TEST (CVF)
OD_FINDINGS: FULL
OS_FINDINGS: FULL